# Patient Record
Sex: FEMALE | Race: WHITE | NOT HISPANIC OR LATINO
[De-identification: names, ages, dates, MRNs, and addresses within clinical notes are randomized per-mention and may not be internally consistent; named-entity substitution may affect disease eponyms.]

---

## 2019-11-25 PROBLEM — Z00.00 ENCOUNTER FOR PREVENTIVE HEALTH EXAMINATION: Status: ACTIVE | Noted: 2019-11-25

## 2019-12-27 ENCOUNTER — RX RENEWAL (OUTPATIENT)
Age: 24
End: 2019-12-27

## 2020-02-06 ENCOUNTER — APPOINTMENT (OUTPATIENT)
Dept: NEUROLOGY | Facility: CLINIC | Age: 25
End: 2020-02-06
Payer: MEDICARE

## 2020-02-06 VITALS — BODY MASS INDEX: 20.16 KG/M2 | WEIGHT: 100 LBS | HEIGHT: 59 IN

## 2020-02-06 DIAGNOSIS — F81.9 DEVELOPMENTAL DISORDER OF SCHOLASTIC SKILLS, UNSPECIFIED: ICD-10-CM

## 2020-02-06 DIAGNOSIS — Z78.9 OTHER SPECIFIED HEALTH STATUS: ICD-10-CM

## 2020-02-06 PROCEDURE — 99214 OFFICE O/P EST MOD 30 MIN: CPT

## 2020-02-06 NOTE — DISCUSSION/SUMMARY
[FreeTextEntry1] : Patient with Epilepsy with a history of MR.\par \par Doing well. COntinues to remain stable. No changes. At this time we recommend the following: \par \par 1. Continue Sabril [phone] (managed by Dr. Fields)\par 2. Continue Clorazepate 3.75mg 2 tabs daily\par 3. Continue TPX 50-50-100mg\par 4. RTC in 1 year if stable

## 2020-02-06 NOTE — PHYSICAL EXAM
[General Appearance - Alert] : alert [FreeTextEntry1] : EXAM IS LIMITED DUE TO HISTORY\par \par PRESENT IN WHEELCHAIR\par \par ALERT AND AWAKE\par \par LOOKS UP WHEN NAME IS CALLED.\par \par INCREASED TONE\par \par NON AMBULATORY\par \par DOES NOT FOLLOW COMMANDS

## 2020-02-06 NOTE — HISTORY OF PRESENT ILLNESS
[FreeTextEntry1] : Ms. BIRCH presents today for a follow up visit of Epilepsy with a history of MR.\par \par At this time mom reports patient continues to do well. Continues to have baseline seizures about once a day with activity arrest, drooping to one side that lasts a few seconds with full return to baseline. She denies any clustering or changes in characteristics of events. \par \par She remains complaint with medication. Sleeping well and behavior remains stable. Follows with PCP Dr. Fields who helps patient get her Sabril through a special program. \par

## 2020-05-14 ENCOUNTER — RX RENEWAL (OUTPATIENT)
Age: 25
End: 2020-05-14

## 2021-04-05 ENCOUNTER — NON-APPOINTMENT (OUTPATIENT)
Age: 26
End: 2021-04-05

## 2021-05-03 ENCOUNTER — RX RENEWAL (OUTPATIENT)
Age: 26
End: 2021-05-03

## 2021-05-17 ENCOUNTER — APPOINTMENT (OUTPATIENT)
Dept: NEUROLOGY | Facility: CLINIC | Age: 26
End: 2021-05-17
Payer: MEDICARE

## 2021-05-17 PROCEDURE — 99212 OFFICE O/P EST SF 10 MIN: CPT | Mod: 95

## 2021-05-17 NOTE — DISCUSSION/SUMMARY
[FreeTextEntry1] : Patient with  Epilepsy with a history of MR.\par \par Continues to do very well. No changes. Continue as above. Will notify the office of any changes. \par \par RTC in 4 months if stable.\par \par All questions and concerns were addressed.

## 2021-05-17 NOTE — DATA REVIEWED
Pt presents with cough, congestion and sore throat x 3 days. Mother denies fevers, nausea or vomiting.   
[No studies available for review at this time.] : No studies available for review at this time.

## 2021-05-17 NOTE — HISTORY OF PRESENT ILLNESS
[FreeTextEntry1] : Ms. BIRCH presents today for a follow up visit of Epilepsy with a history of MR.\par \par At this time mom reports patient continues to do well. Continues to have baseline seizures about once a day with activity arrest, drooping to one side that lasts a few seconds with full return to baseline. She denies any clustering or changes in characteristics of events. \par \par She remains complaint with medication. Sleeping well and behavior remains stable. Follows with PCP Dr. Fields who helps patient get her Sabril through a special program. \par \par of note, schedule for COVID Moderna vaccine #2 on 5/21/21. Did well with first shot.

## 2021-08-05 ENCOUNTER — OUTPATIENT (OUTPATIENT)
Dept: OUTPATIENT SERVICES | Facility: HOSPITAL | Age: 26
LOS: 1 days | Discharge: HOME | End: 2021-08-05

## 2021-08-05 DIAGNOSIS — F44.5 CONVERSION DISORDER WITH SEIZURES OR CONVULSIONS: ICD-10-CM

## 2021-08-05 DIAGNOSIS — G93.40 ENCEPHALOPATHY, UNSPECIFIED: ICD-10-CM

## 2022-02-21 ENCOUNTER — OUTPATIENT (OUTPATIENT)
Dept: OUTPATIENT SERVICES | Facility: HOSPITAL | Age: 27
LOS: 1 days | Discharge: HOME | End: 2022-02-21

## 2022-03-01 DIAGNOSIS — G40.919 EPILEPSY, UNSPECIFIED, INTRACTABLE, WITHOUT STATUS EPILEPTICUS: ICD-10-CM

## 2022-03-09 ENCOUNTER — RX RENEWAL (OUTPATIENT)
Age: 27
End: 2022-03-09

## 2022-10-12 ENCOUNTER — APPOINTMENT (OUTPATIENT)
Dept: NEUROLOGY | Facility: CLINIC | Age: 27
End: 2022-10-12

## 2022-11-10 ENCOUNTER — APPOINTMENT (OUTPATIENT)
Dept: NEUROLOGY | Facility: CLINIC | Age: 27
End: 2022-11-10

## 2022-11-10 PROCEDURE — 99448 NTRPROF PH1/NTRNET/EHR 21-30: CPT

## 2022-11-10 RX ORDER — TOPIRAMATE 100 MG/1
100 TABLET, FILM COATED ORAL
Qty: 180 | Refills: 3 | Status: ACTIVE | COMMUNITY

## 2022-11-10 RX ORDER — VIGABATRIN 500 MG/1
500 TABLET, FILM COATED ORAL
Refills: 0 | Status: ACTIVE | COMMUNITY

## 2022-11-10 NOTE — REASON FOR VISIT
[Home] : at home, [unfilled] , at the time of the visit. [Mother] : mother [Follow-Up: _____] : a [unfilled] follow-up visit

## 2022-11-10 NOTE — HISTORY OF PRESENT ILLNESS
[FreeTextEntry1] : Ms. BIRCH presents today for a follow up visit of Epilepsy with a history of MR.\par \par At this time mom reports patient continues to do well. Continues to have baseline seizures about once a day with activity arrest, drooping to one side that lasts a few seconds with full return to baseline. She denies any clustering or changes in characteristics of events. \par \par She remains complaint with medication. Sleeping well and behavior remains stable. Follows with PCP regularily. \par

## 2022-11-10 NOTE — DISCUSSION/SUMMARY
[FreeTextEntry1] : Patient with  Epilepsy with a history of MR.\par \par Continues to do very well. No changes. Continue as above. Will notify the office of any changes. \par \par RTC in 6-12 months if stable.\par \par All questions and concerns were addressed.

## 2023-02-27 ENCOUNTER — RX RENEWAL (OUTPATIENT)
Age: 28
End: 2023-02-27

## 2023-02-27 RX ORDER — TOPIRAMATE 50 MG/1
50 TABLET, FILM COATED ORAL
Qty: 360 | Refills: 3 | Status: ACTIVE | COMMUNITY
Start: 2020-05-14 | End: 1900-01-01

## 2024-04-08 ENCOUNTER — APPOINTMENT (OUTPATIENT)
Dept: NEUROLOGY | Facility: CLINIC | Age: 29
End: 2024-04-08
Payer: MEDICARE

## 2024-04-08 DIAGNOSIS — G40.909 EPILEPSY, UNSPECIFIED, NOT INTRACTABLE, W/OUT STATUS EPILEPTICUS: ICD-10-CM

## 2024-04-08 PROCEDURE — 99447 NTRPROF PH1/NTRNET/EHR 11-20: CPT

## 2024-04-08 PROCEDURE — 99442: CPT | Mod: 95

## 2024-04-08 RX ORDER — CLORAZEPATE DIPOTASSIUM 15 MG/1
15 TABLET ORAL
Qty: 45 | Refills: 0 | Status: DISCONTINUED | COMMUNITY
Start: 2019-12-27 | End: 2024-04-08

## 2024-04-08 NOTE — DISCUSSION/SUMMARY
[FreeTextEntry1] : Patient with  Epilepsy with a history of MR.  Continues to do very well. No changes. Continue as above. Will notify the office of any changes.   RTC in 6-12 months if stable.  All questions and concerns were addressed to the best of my ability. Emotional support was rendered.

## 2024-10-03 ENCOUNTER — RX RENEWAL (OUTPATIENT)
Age: 29
End: 2024-10-03

## 2025-04-05 ENCOUNTER — RX RENEWAL (OUTPATIENT)
Age: 30
End: 2025-04-05

## 2025-09-16 ENCOUNTER — RX RENEWAL (OUTPATIENT)
Age: 30
End: 2025-09-16